# Patient Record
Sex: FEMALE | Race: WHITE | Employment: FULL TIME | URBAN - METROPOLITAN AREA
[De-identification: names, ages, dates, MRNs, and addresses within clinical notes are randomized per-mention and may not be internally consistent; named-entity substitution may affect disease eponyms.]

---

## 2020-01-13 ENCOUNTER — HOSPITAL ENCOUNTER (OUTPATIENT)
Dept: MAMMOGRAPHY | Age: 50
Discharge: HOME | End: 2020-01-13
Attending: OBSTETRICS & GYNECOLOGY
Payer: COMMERCIAL

## 2020-01-13 DIAGNOSIS — Z12.31 VISIT FOR SCREENING MAMMOGRAM: ICD-10-CM

## 2020-01-13 PROCEDURE — 77063 BREAST TOMOSYNTHESIS BI: CPT

## 2022-03-11 LAB
AVERAGE GLUCOSE: NORMAL
HBA1C MFR BLD: 5.1 %

## 2022-08-10 ENCOUNTER — OFFICE VISIT (OUTPATIENT)
Dept: OBGYN CLINIC | Age: 52
End: 2022-08-10
Payer: COMMERCIAL

## 2022-08-10 VITALS
HEIGHT: 63 IN | WEIGHT: 118.2 LBS | BODY MASS INDEX: 20.94 KG/M2 | SYSTOLIC BLOOD PRESSURE: 110 MMHG | DIASTOLIC BLOOD PRESSURE: 74 MMHG

## 2022-08-10 DIAGNOSIS — Z76.0 MEDICATION REFILL: Primary | ICD-10-CM

## 2022-08-10 PROCEDURE — 99213 OFFICE O/P EST LOW 20 MIN: CPT | Performed by: OBSTETRICS & GYNECOLOGY

## 2022-08-10 RX ORDER — ALPRAZOLAM 1 MG/1
TABLET ORAL
COMMUNITY
Start: 2022-07-16

## 2022-08-10 NOTE — PROGRESS NOTES
Leah Mccloud  is a 45 y.o. female, No obstetric history on file. .  No LMP recorded (lmp unknown). Patient is postmenopausal., who is being seen for hormone therapy replacement talk. She went to a bioidentical hormone Doctor. Would like to have a refill on medication the physician prescribed as it is improving her depression. Is postmenopausal.   She really feels so much better. She is also taking cymbalta. Her mother with alzheimers is still living with her. She really does not desire to go back to him. I encouraged her to bring her blood work with her to her annual exam.      Last Pap: 2021 negative ; HPV negative    HISTORY:    OB History          3    Para   2    Term   2            AB   1    Living   2         SAB        IAB        Ectopic        Molar        Multiple        Live Births                  GYN History         Sexual History:   Social History     Substance and Sexual Activity   Sexual Activity Yes    Partners: Male    Birth control/protection: None          has a past medical history of Vision problem. .    Past Surgical History:   Procedure Laterality Date    BREAST SURGERY      MASTOPEXY         Her current meds are:    Current Outpatient Medications:     ALPRAZolam (XANAX) 1 MG tablet, TAKE 1 TABLET BY MOUTH THREE TIMES A DAY, Disp: , Rfl:     PROGESTERONE VA, Place vaginally 125 mg at bedtime nightly, Disp: , Rfl:     Misc. Devices MISC, 1 each by Does not apply route once as needed Biest (50/50) 1 mg/ ML cream, Disp: , Rfl:     DULoxetine (CYMBALTA) 30 MG extended release capsule, Take 30 mg by mouth 2 times daily, Disp: , Rfl:    Thyroid support  Estrodim  Mvi  Prog   Ultraflora metagenics. Prog capsules 100mg   Biest 1mg  / ml 50/50 -0.5ml vaginally 6 nights per week.     Review of Systems  Neg except hpi     PHYSICAL EXAM:  /74   Ht 5' 2.5\" (1.588 m)   Wt 118 lb 3.2 oz (53.6 kg)   LMP  (LMP Unknown)   BMI 21.27 kg/m²   Physical Exam  General: well nourished well developed female in nad   ASSESSMENT / PLAN:  Ana Quinones was seen today for advice only. Diagnoses and all orders for this visit:    Medication refill    Will submit refills of prog capsules and biest cream [facility]. She is aware. Will see her for annual exam in next 1-2 months.       Mirna Murray, DO

## 2022-08-10 NOTE — PROGRESS NOTES
Per Dr Fam Dowell called into [facility]    100 mg progesterone nightly with a year refill  Bieste cream 50/50 1 mg/ 1 ml use 0.5 ml 6 nights a week with 1 year refill. Voicemail left on [facility]. Patient phone number, birthday left on voicemail line.

## 2022-09-07 ENCOUNTER — OFFICE VISIT (OUTPATIENT)
Dept: OBGYN CLINIC | Age: 52
End: 2022-09-07
Payer: COMMERCIAL

## 2022-09-07 VITALS
WEIGHT: 120 LBS | DIASTOLIC BLOOD PRESSURE: 60 MMHG | SYSTOLIC BLOOD PRESSURE: 102 MMHG | HEIGHT: 63 IN | BODY MASS INDEX: 21.26 KG/M2

## 2022-09-07 DIAGNOSIS — Z13.89 SCREENING FOR GENITOURINARY CONDITION: ICD-10-CM

## 2022-09-07 DIAGNOSIS — Z12.11 SCREENING FOR COLON CANCER: ICD-10-CM

## 2022-09-07 DIAGNOSIS — Z12.31 SCREENING MAMMOGRAM FOR BREAST CANCER: ICD-10-CM

## 2022-09-07 DIAGNOSIS — Z01.419 WELL WOMAN EXAM: Primary | ICD-10-CM

## 2022-09-07 LAB
BILIRUBIN, URINE, POC: NEGATIVE
BLOOD URINE, POC: NEGATIVE
GLUCOSE URINE, POC: NEGATIVE
KETONES, URINE, POC: NEGATIVE
LEUKOCYTE ESTERASE, URINE, POC: NEGATIVE
NITRITE, URINE, POC: NEGATIVE
PH, URINE, POC: 6 (ref 4.6–8)
PROTEIN,URINE, POC: NEGATIVE
SPECIFIC GRAVITY, URINE, POC: 1 (ref 1–1.03)
URINALYSIS CLARITY, POC: CLEAR
URINALYSIS COLOR, POC: NORMAL
UROBILINOGEN, POC: NORMAL

## 2022-09-07 PROCEDURE — 99396 PREV VISIT EST AGE 40-64: CPT | Performed by: OBSTETRICS & GYNECOLOGY

## 2022-09-07 PROCEDURE — 81002 URINALYSIS NONAUTO W/O SCOPE: CPT | Performed by: OBSTETRICS & GYNECOLOGY

## 2022-09-07 ASSESSMENT — PATIENT HEALTH QUESTIONNAIRE - PHQ9
SUM OF ALL RESPONSES TO PHQ QUESTIONS 1-9: 0
SUM OF ALL RESPONSES TO PHQ9 QUESTIONS 1 & 2: 0
SUM OF ALL RESPONSES TO PHQ QUESTIONS 1-9: 0
1. LITTLE INTEREST OR PLEASURE IN DOING THINGS: 0
2. FEELING DOWN, DEPRESSED OR HOPELESS: 0
SUM OF ALL RESPONSES TO PHQ QUESTIONS 1-9: 0
SUM OF ALL RESPONSES TO PHQ QUESTIONS 1-9: 0

## 2022-09-07 NOTE — PROGRESS NOTES
HPI:  Ms. Alek Parr is a 45 y.o. female [id number] who is here today for a well woman exam. She complains of wanting to discuss having cologuard. She is a good candidate. Hx of very dense breast tissue and may want to consider breast mri at some juncture. No family hx. She did bring copies of her blood work  -total chol and ldl elevated but hdl 63. Electrolytes and cbc wnl. Thyroid  normal but tpo antibodies elevated mildly. On \"thyroid support\" taking vit d. Prog and estrogens were low. Taking hrt. Date Performed Result   PAP 21 Wnl, hpv neg   Mammogram  Birads 1, BD4   Colonoscopy in 30s per pt Wnl per pt   Dexa never na       OB History          3    Para   2    Term   2            AB   1    Living   2         SAB        IAB        Ectopic        Molar        Multiple        Live Births                [employer]. Both kids. Both sons. GYN History     No LMP recorded (lmp unknown). Patient is postmenopausal.         Past Medical History:   Diagnosis Date    Vision problem     wears glasses     Past Surgical History:   Procedure Laterality Date    BREAST SURGERY      MASTOPEXY         No Known Allergies    Current Outpatient Medications   Medication Sig Dispense Refill    ALPRAZolam (XANAX) 1 MG tablet TAKE 1 TABLET BY MOUTH THREE TIMES A DAY      PROGESTERONE VA Place vaginally 125 mg at bedtime nightly      Misc. Devices MISC 1 each by Does not apply route once as needed Biest (50/50) 1 mg/ ML cream      DULoxetine (CYMBALTA) 30 MG extended release capsule Take 30 mg by mouth 2 times daily       No current facility-administered medications for this visit.          Social History     Socioeconomic History    Marital status:  - 27 years      Spouse name: Not on file    Number of children: Not on file    Years of education: Not on file    Highest education level: Not on file   Occupational History    Dentist.  to dentist    Tobacco Use    Smoking status: Former    Smokeless tobacco: Never    Tobacco comments:     Quit smoking: quit 1994   Vaping Use    Vaping Use: Never used   Substance and Sexual Activity    Alcohol use: Yes     Comment: occ    Drug use: No    Sexual activity: Yes     Partners: Male     Birth control/protection: None   Other Topics Concern    Exercis - working out 3 times per week    Social History Narrative    Not on file     Social Determinants of Health     Financial Resource Strain: Not on file   Food Insecurity: Not on file   Transportation Needs: Not on file   Physical Activity: Not on file   Stress: Not on file   Social Connections: Not on file   Intimate Partner Violence: Not on file   Housing Stability: Not on file     Family History   Problem Relation Age of Onset    Heart Disease Father     Alzheimer's Disease Mother        Review of Systems   Neg except hpi     /60 (Site: Right Upper Arm, Position: Sitting)   Ht 5' 2.5\" (1.588 m)   Wt 120 lb (54.4 kg)   LMP  (LMP Unknown)   BMI 21.60 kg/m²        Physical Exam  Constitutional:       General: She is not in acute distress. Appearance: She is well-developed. HENT:      Head: Normocephalic and atraumatic. Neck:      Thyroid: No thyroid mass or thyromegaly. Cardiovascular:      Rate and Rhythm: Normal rate and regular rhythm. Pulmonary:      Effort: Pulmonary effort is normal.      Breath sounds: Normal breath sounds. Chest:   Breasts:     Right: Normal. No mass or skin change (reduction scars noted bilaterally. dense tissue noted. ). Left: Normal. No mass or skin change. Abdominal:      General: There is no distension. Palpations: Abdomen is soft. Tenderness: There is no abdominal tenderness. Genitourinary:     General: Normal vulva. Labia:         Right: No rash or lesion. Left: No rash or lesion. Vagina: Normal.      Uterus: Normal. Not enlarged.        Adnexa: Right adnexa normal and left adnexa normal. Musculoskeletal:      Cervical back: Normal range of motion and neck supple. Neurological:      General: No focal deficit present. Mental Status: She is alert. Psychiatric:         Attention and Perception: Attention normal.         Mood and Affect: Mood and affect normal.       Assessment/Plan  Diagnoses and all orders for this visit:    Well woman exam    Screening for genitourinary condition  -     AMB POC URINALYSIS DIP STICK MANUAL W/O MICRO    Screening mammogram for breast cancer  -     [facility] SUSAN DIGITAL SCREEN BILATERAL;  Future    Screening for colon cancer  -     Cologuard (Fecal DNA Colorectal Cancer Screening)     Return in about 1 year (around 9/7/2023) for annual exam.     Violet Davis D.O

## 2022-09-21 LAB — NONINV COLON CA DNA+OCC BLD SCRN STL QL: NEGATIVE

## 2023-08-15 NOTE — TELEPHONE ENCOUNTER
Patient called stating that she has an upcoming appointment with Dr. Eunice Portillo on 10/11/23 and will need a refill of her compounded progesterone to last until her appointment. [facility] uses [facility] in [place]. She was last seen on 9/7/22 for WWE with Dr. Eunice Portillo.

## 2023-08-16 NOTE — TELEPHONE ENCOUNTER
Orders Placed This Encounter    progesterone (ENDOMETRIN) 100 MG suppository     Si mg qhs  #30 with 1 refill   Called to [facility] [phone]  Left detailed message on voicemail 23     Dispense:  30 suppository     Refill:  1

## 2023-08-18 ENCOUNTER — TELEPHONE (OUTPATIENT)
Dept: OBGYN CLINIC | Age: 53
End: 2023-08-18

## 2023-08-18 RX ORDER — PROGESTERONE 100 MG/1
CAPSULE ORAL
Qty: 30 CAPSULE | Refills: 1 | OUTPATIENT
Start: 2023-08-18

## 2023-09-21 ENCOUNTER — TELEPHONE (OUTPATIENT)
Dept: OBGYN CLINIC | Age: 53
End: 2023-09-21

## 2023-09-21 NOTE — TELEPHONE ENCOUNTER
Spoke with Cailin at [facility] and she states that they have a rx on file for pt from 8/18/23 that was not filled yet. They will get this ready and contact the patient once it is ready for pickup.

## 2023-10-09 SDOH — ECONOMIC STABILITY: TRANSPORTATION INSECURITY
IN THE PAST 12 MONTHS, HAS LACK OF TRANSPORTATION KEPT YOU FROM MEETINGS, WORK, OR FROM GETTING THINGS NEEDED FOR DAILY LIVING?: NO

## 2023-10-09 SDOH — ECONOMIC STABILITY: INCOME INSECURITY: HOW HARD IS IT FOR YOU TO PAY FOR THE VERY BASICS LIKE FOOD, HOUSING, MEDICAL CARE, AND HEATING?: NOT HARD AT ALL

## 2023-10-09 SDOH — ECONOMIC STABILITY: HOUSING INSECURITY
IN THE LAST 12 MONTHS, WAS THERE A TIME WHEN YOU DID NOT HAVE A STEADY PLACE TO SLEEP OR SLEPT IN A SHELTER (INCLUDING NOW)?: NO

## 2023-10-09 SDOH — ECONOMIC STABILITY: FOOD INSECURITY: WITHIN THE PAST 12 MONTHS, YOU WORRIED THAT YOUR FOOD WOULD RUN OUT BEFORE YOU GOT MONEY TO BUY MORE.: NEVER TRUE

## 2023-10-09 SDOH — ECONOMIC STABILITY: FOOD INSECURITY: WITHIN THE PAST 12 MONTHS, THE FOOD YOU BOUGHT JUST DIDN'T LAST AND YOU DIDN'T HAVE MONEY TO GET MORE.: NEVER TRUE

## 2023-10-11 ENCOUNTER — OFFICE VISIT (OUTPATIENT)
Dept: OBGYN CLINIC | Age: 53
End: 2023-10-11
Payer: COMMERCIAL

## 2023-10-11 VITALS
SYSTOLIC BLOOD PRESSURE: 102 MMHG | BODY MASS INDEX: 19.67 KG/M2 | DIASTOLIC BLOOD PRESSURE: 64 MMHG | WEIGHT: 111 LBS | HEIGHT: 63 IN

## 2023-10-11 DIAGNOSIS — Z13.89 SCREENING FOR GENITOURINARY CONDITION: ICD-10-CM

## 2023-10-11 DIAGNOSIS — Z01.419 WELL WOMAN EXAM: Primary | ICD-10-CM

## 2023-10-11 LAB
BILIRUBIN, URINE, POC: NEGATIVE
BLOOD URINE, POC: NEGATIVE
GLUCOSE URINE, POC: NEGATIVE
KETONES, URINE, POC: NEGATIVE
LEUKOCYTE ESTERASE, URINE, POC: NEGATIVE
NITRITE, URINE, POC: NEGATIVE
PH, URINE, POC: 5 (ref 4.6–8)
PROTEIN,URINE, POC: NEGATIVE
SPECIFIC GRAVITY, URINE, POC: 1.01 (ref 1–1.03)
URINALYSIS CLARITY, POC: CLEAR
URINALYSIS COLOR, POC: NORMAL
UROBILINOGEN, POC: NORMAL

## 2023-10-11 PROCEDURE — 81002 URINALYSIS NONAUTO W/O SCOPE: CPT | Performed by: OBSTETRICS & GYNECOLOGY

## 2023-10-11 PROCEDURE — 99396 PREV VISIT EST AGE 40-64: CPT | Performed by: OBSTETRICS & GYNECOLOGY

## 2023-10-11 ASSESSMENT — PATIENT HEALTH QUESTIONNAIRE - PHQ9
SUM OF ALL RESPONSES TO PHQ QUESTIONS 1-9: 2
2. FEELING DOWN, DEPRESSED OR HOPELESS: 2
SUM OF ALL RESPONSES TO PHQ QUESTIONS 1-9: 2
SUM OF ALL RESPONSES TO PHQ9 QUESTIONS 1 & 2: 2
1. LITTLE INTEREST OR PLEASURE IN DOING THINGS: 0
SUM OF ALL RESPONSES TO PHQ QUESTIONS 1-9: 2
SUM OF ALL RESPONSES TO PHQ QUESTIONS 1-9: 2

## 2023-10-11 NOTE — PROGRESS NOTES
HPI:  Ms. Salvador Reeves is a 52 y.o. female [id number] who is here today for a well woman exam. She complains of none. She feels well on her hrt. She has lost weight but thinks related to her mother  /  her dementia. She likely is going to be living in a facility. She will do mammogram this year. 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 GYN History     No LMP recorded (lmp unknown). Patient is postmenopausal.     Past Medical History:   Diagnosis Date    Vision problem     wears glasses     Past Surgical History:   Procedure Laterality Date    BREAST SURGERY      MASTOPEXY         No Known Allergies    Current Outpatient Medications   Medication Sig Dispense Refill    Multiple Vitamin (MULTIVITAMIN PO) Take by mouth      progesterone (PROMETRIUM) 100 MG CAPS capsule Progesterone capsules 100mg   1 po qd  #30 with 1 refill  Called to [facility], spoke with [facility] 23  [phone] 30 capsule 1    UNABLE TO FIND Biest 50/50cream  1gram/ml  0.5ml 6 nights a week   #1 month with 1 refill  Called to [facility] at [facility] [phone] (Patient taking differently: Biest 50/50cream  1gram/ml per vagina  0.5ml 6 nights a week   #1 month with 1 refill  Called to [facility] at [facility] [phone]) 30 mL 1    ALPRAZolam (XANAX) 1 MG tablet TAKE 1 TABLET BY MOUTH THREE TIMES A DAY      DULoxetine (CYMBALTA) 30 MG extended release capsule Take 1 capsule by mouth 2 times daily       No current facility-administered medications for this visit.          Social History     Socioeconomic History    Marital status:  -23 years      Spouse name: Not on file

## 2023-10-12 RX ORDER — PROGESTERONE 100 MG/1
CAPSULE ORAL
Qty: 30 CAPSULE | Refills: 1 | OUTPATIENT
Start: 2023-10-12

## 2023-10-16 LAB
CYTOLOGIST CVX/VAG CYTO: NORMAL
CYTOLOGY CVX/VAG DOC THIN PREP: NORMAL
HPV APTIMA: NEGATIVE
Lab: NORMAL
PATH REPORT.FINAL DX SPEC: NORMAL
STAT OF ADQ CVX/VAG CYTO-IMP: NORMAL

## 2024-02-29 NOTE — TELEPHONE ENCOUNTER
Orders Placed This Encounter    progesterone (PROMETRIUM) 100 MG CAPS capsule     Sig: Progesterone capsules 100mg   1 po qd  #30 with 1 refill  Called to [facility], spoke with [facility] 08/18/23  [phone]     Dispense:  30 capsule     Refill:  1    UNABLE TO FIND     Sig: Biest 50/50cream  1gram/ml  0.5ml 6 nights a week   #1 month with 1 refill  Called to [facility] at [facility] [phone]     Dispense:  30 mL     Refill:  1 Normal vision: sees adequately in most situations; can see medication labels, newsprint

## 2024-10-18 SDOH — ECONOMIC STABILITY: FOOD INSECURITY: WITHIN THE PAST 12 MONTHS, YOU WORRIED THAT YOUR FOOD WOULD RUN OUT BEFORE YOU GOT MONEY TO BUY MORE.: NEVER TRUE

## 2024-10-18 SDOH — ECONOMIC STABILITY: FOOD INSECURITY: WITHIN THE PAST 12 MONTHS, THE FOOD YOU BOUGHT JUST DIDN'T LAST AND YOU DIDN'T HAVE MONEY TO GET MORE.: NEVER TRUE

## 2024-10-18 SDOH — ECONOMIC STABILITY: INCOME INSECURITY: HOW HARD IS IT FOR YOU TO PAY FOR THE VERY BASICS LIKE FOOD, HOUSING, MEDICAL CARE, AND HEATING?: NOT HARD AT ALL

## 2024-10-21 ENCOUNTER — OFFICE VISIT (OUTPATIENT)
Dept: OBGYN CLINIC | Age: 54
End: 2024-10-21
Payer: COMMERCIAL

## 2024-10-21 VITALS
HEIGHT: 63 IN | BODY MASS INDEX: 20.62 KG/M2 | DIASTOLIC BLOOD PRESSURE: 62 MMHG | SYSTOLIC BLOOD PRESSURE: 110 MMHG | WEIGHT: 116.4 LBS

## 2024-10-21 DIAGNOSIS — Z01.419 WELL WOMAN EXAM: Primary | ICD-10-CM

## 2024-10-21 DIAGNOSIS — Z13.89 SCREENING FOR GENITOURINARY CONDITION: ICD-10-CM

## 2024-10-21 LAB
BILIRUBIN, URINE, POC: NEGATIVE
BLOOD URINE, POC: NEGATIVE
GLUCOSE URINE, POC: NEGATIVE
KETONES, URINE, POC: NEGATIVE
LEUKOCYTE ESTERASE, URINE, POC: NEGATIVE
NITRITE, URINE, POC: NEGATIVE
PH, URINE, POC: 5 (ref 4.6–8)
PROTEIN,URINE, POC: NEGATIVE
SPECIFIC GRAVITY, URINE, POC: 1.01 (ref 1–1.03)
URINALYSIS CLARITY, POC: CLEAR
URINALYSIS COLOR, POC: NORMAL
UROBILINOGEN, POC: NORMAL MG/DL

## 2024-10-21 PROCEDURE — 81002 URINALYSIS NONAUTO W/O SCOPE: CPT | Performed by: OBSTETRICS & GYNECOLOGY

## 2024-10-21 PROCEDURE — 99396 PREV VISIT EST AGE 40-64: CPT | Performed by: OBSTETRICS & GYNECOLOGY

## 2024-10-21 RX ORDER — PROGESTERONE 100 MG/1
CAPSULE ORAL
Qty: 30 CAPSULE | Refills: 1 | OUTPATIENT
Start: 2024-10-21

## 2024-10-21 RX ORDER — PROGESTERONE 100 MG/1
CAPSULE ORAL
Qty: 30 CAPSULE | Refills: 1 | Status: SHIPPED | OUTPATIENT
Start: 2024-10-21 | End: 2024-10-21 | Stop reason: SDUPTHER

## 2024-10-21 ASSESSMENT — PATIENT HEALTH QUESTIONNAIRE - PHQ9
SUM OF ALL RESPONSES TO PHQ QUESTIONS 1-9: 0
1. LITTLE INTEREST OR PLEASURE IN DOING THINGS: NOT AT ALL
SUM OF ALL RESPONSES TO PHQ QUESTIONS 1-9: 0
2. FEELING DOWN, DEPRESSED OR HOPELESS: NOT AT ALL
SUM OF ALL RESPONSES TO PHQ9 QUESTIONS 1 & 2: 0
SUM OF ALL RESPONSES TO PHQ QUESTIONS 1-9: 0
SUM OF ALL RESPONSES TO PHQ QUESTIONS 1-9: 0

## 2024-10-21 NOTE — PROGRESS NOTES
HPI:  Ms. Navarro is a 54 y.o. female  who is here today for a well woman exam. She complains of none.  She feels well on her hrt.         Date Performed Result   PAP 10/11/23 Wnl, hpv neg   Mammogram , HER Scan     Colonoscopy  cologuard, colonoscopy  Polyps removed, 3 year repeat    Dexa never na       OB History          3    Para   2    Term   2            AB   1    Living   2         SAB        IAB        Ectopic        Molar        Multiple        Live Births   2            Jr and sr at [employer].   GYN History     No LMP recorded (lmp unknown). Patient is postmenopausal.       Past Medical History:   Diagnosis Date    Vision problem     wears glasses     Past Surgical History:   Procedure Laterality Date    BREAST SURGERY      COLONOSCOPY      MASTOPEXY         Allergies   Allergen Reactions    Lidocaine Itching, Swelling and Rash     Topical lidocaine only       Current Outpatient Medications   Medication Sig Dispense Refill    progesterone (PROMETRIUM) 100 MG CAPS capsule Progesterone capsules 100mg   1 po qd  #90 with 3refill  Called to [facility], left detailed message regarding refill info 10/12/23  [phone] 30 capsule 1    UNABLE TO FIND Biest 50/50cream  1gram/ml per vagina  0.5ml 6 nights a week   3 month with 3 refill  Called  [facility], left detailed info regarding refill 10/12/23   [phone] 30 mL 1    Multiple Vitamin (MULTIVITAMIN PO) Take by mouth      ALPRAZolam (XANAX) 1 MG tablet TAKE 1 TABLET BY MOUTH THREE TIMES A DAY      DULoxetine (CYMBALTA) 30 MG extended release capsule Take 1 capsule by mouth 2 times daily       No current facility-administered medications for this visit.         Social History     Socioeconomic History    Marital status:  - 24 years      Spouse name: Not on file    Number of children: Not on file    Years of education: Not on file    Highest education level: Not on file